# Patient Record
Sex: MALE | Race: WHITE | NOT HISPANIC OR LATINO | Employment: UNEMPLOYED | URBAN - METROPOLITAN AREA
[De-identification: names, ages, dates, MRNs, and addresses within clinical notes are randomized per-mention and may not be internally consistent; named-entity substitution may affect disease eponyms.]

---

## 2018-05-30 ENCOUNTER — APPOINTMENT (OUTPATIENT)
Dept: RADIOLOGY | Facility: MEDICAL CENTER | Age: 58
End: 2018-05-30
Attending: EMERGENCY MEDICINE

## 2018-05-30 ENCOUNTER — HOSPITAL ENCOUNTER (EMERGENCY)
Facility: MEDICAL CENTER | Age: 58
End: 2018-05-30
Attending: EMERGENCY MEDICINE

## 2018-05-30 VITALS
SYSTOLIC BLOOD PRESSURE: 99 MMHG | RESPIRATION RATE: 16 BRPM | BODY MASS INDEX: 23.27 KG/M2 | HEART RATE: 73 BPM | DIASTOLIC BLOOD PRESSURE: 64 MMHG | WEIGHT: 166.23 LBS | OXYGEN SATURATION: 93 % | TEMPERATURE: 98 F | HEIGHT: 71 IN

## 2018-05-30 DIAGNOSIS — S99.191A CLOSED FRACTURE OF BASE OF FIFTH METATARSAL BONE OF RIGHT FOOT AT METAPHYSEAL-DIAPHYSEAL JUNCTION, INITIAL ENCOUNTER: ICD-10-CM

## 2018-05-30 PROCEDURE — 99284 EMERGENCY DEPT VISIT MOD MDM: CPT

## 2018-05-30 PROCEDURE — 302874 HCHG BANDAGE ACE 2 OR 3""

## 2018-05-30 PROCEDURE — 73630 X-RAY EXAM OF FOOT: CPT | Mod: RT

## 2018-05-30 PROCEDURE — 302875 HCHG BANDAGE ACE 4 OR 6""

## 2018-05-30 PROCEDURE — 29515 APPLICATION SHORT LEG SPLINT: CPT

## 2018-05-30 ASSESSMENT — PAIN SCALES - GENERAL: PAINLEVEL_OUTOF10: 7

## 2018-05-31 NOTE — ED NOTES
Pt discharged home. Assessment complete. Pt ambulates self with crutches. VS stable. Pt verbalized understanding discharge instructions.

## 2018-05-31 NOTE — ED PROVIDER NOTES
"CHIEF COMPLAINT  Chief Complaint   Patient presents with   • Foot Pain     pt reports hurting his right foot 2 weeks ago. pt reports hitting his foot on side walk. pt has bruising and red toes on right foot. CMS intact. pulses present.        HPI  Sebastian Alvarado is a 57 y.o. male who presents With right foot pain for the past 2 weeks. States that he fell into a hole walking on a sidewalk. Denies any other significant injuries. Has a slight limp with walking. Denies any open wounds. No knee or hip pain. No loss of consciousness or headaches. No neck pain or back pain. Has swelling and bruising to the middle toes of the affected foot. Contralateral side is normal.    REVIEW OF SYSTEMS  See HPI for further details. All other systems are negative.     PAST MEDICAL HISTORY    Denies significant past medical history.    SOCIAL HISTORY  Social History     Social History Main Topics   • Smoking status: Current Every Day Smoker   • Smokeless tobacco: Never Used   • Alcohol use No      Comment: \"I stopped two weeks ago when I got busted for a DUI.\"   • Drug use: No   • Sexual activity: Not on file       SURGICAL HISTORY  patient denies any surgical history    CURRENT MEDICATIONS  Home Medications    **Home medications have not yet been reviewed for this encounter**         ALLERGIES  Allergies   Allergen Reactions   • Sulfa Drugs Unspecified     Pt reports \"I get really sick\"        PHYSICAL EXAM  VITAL SIGNS: BP (!) 92/62   Pulse 89   Temp 37 °C (98.6 °F)   Resp 16   Ht 1.803 m (5' 11\")   Wt 75.4 kg (166 lb 3.6 oz)   SpO2 95%   BMI 23.18 kg/m²   Pulse ox interpretation: I interpret this pulse ox as normal.  Constitutional: Alert in no apparent distress.  HENT: No signs of trauma, Bilateral external ears normal, Nose normal.   Neck: Normal range of motion, No tenderness, Supple, No stridor.   Cardiovascular: Regular rate and rhythm.   Thorax & Lungs: No respiratory distress.   Skin: Warm, Dry, No erythema, No rash. " "  Back: No bony tenderness, No CVA tenderness.   Extremities: Intact distal pulses, No edema,  Right foot tenderness to the distal foot and lateral  Foot, ecchymosis to the base of the second and third toes, No cyanosis  Musculoskeletal: Good range of motion in all major joints. No major deformities noted.   Neurologic: Alert , Normal motor function and gait, Normal sensory function, No focal deficits noted.       DIAGNOSTIC STUDIES / PROCEDURES      RADIOLOGY  DX-FOOT-COMPLETE 3+ RIGHT   Final Result         1.  Godoy fracture through the base of the fifth metatarsal.          COURSE & MEDICAL DECISION MAKING  Pertinent Labs & Imaging studies reviewed. (See chart for details)  57 y.o.  Male presenting with right foot pain following a fall 2 weeks ago when his foot fell into a hole, sidewalk. No open wounds. No obvious bony deformities. Does have significant tenderness of the foot however. No ankle, knee, hip tenderness or deformities. X-ray of the affected foot was performed showing evidence of a Godoy fracture. The patient was placed in a posterior short splint and was instructed regarding nonweightbearing status. To follow-up with Dr. Lizama,  [facility], for further management.    Patient was informed of the x-ray findings and the plan of care.. He reports feeling comfortable with discharge plan. To return immediately for any worsening of symptoms or development of any other concerning signs or symptoms.    The patient will return for worsening symptoms or failure of improvement and is stable at the time of discharge. The patient verbalizes understanding in their own words.    BP (!) 99/64   Pulse 73   Temp 36.7 °C (98 °F)   Resp 16   Ht 1.803 m (5' 11\")   Wt 75.4 kg (166 lb 3.6 oz)   SpO2 93%   BMI 23.18 kg/m²     Felipe Lizama M.D.  [address]  [facility] [address]  [phone]    Schedule an appointment as soon as possible for a visit      [facility], " Emergency Dept  [facility] [facility] [address]  [phone]    As needed, If symptoms worsen    Primary care doctor    Schedule an appointment as soon as possible for a visit        FINAL IMPRESSION  1. Closed fracture of base of fifth metatarsal bone of right foot at metaphyseal-diaphyseal junction, initial encounter            Electronically signed by: Tian Wang 5/30/2018 10:37 PM

## 2018-05-31 NOTE — DISCHARGE INSTRUCTIONS
Foot Fracture  Your caregiver has diagnosed you as having a foot fracture (broken bone). Your foot has many bones. You have a fracture, or break, in one of these bones. In some cases, your doctor may put on a splint or removable fracture boot until the swelling in your foot has lessened. A cast may or may not be required.  HOME CARE INSTRUCTIONS   If you do not have a cast or splint:  · You may bear weight on your injured foot as tolerated or advised.   · Do not put any weight on your injured foot for as long as directed by your caregiver. Slowly increase the amount of time you walk on the foot as the pain and swelling allows or as advised.   · Use crutches until you can bear weight without pain. A gradual increase in weight bearing may help.   · Apply ice to the injury for 15-20 minutes each hour while awake for the first 2 days. Put the ice in a plastic bag and place a towel between the bag of ice and your skin.   · If an ace bandage (stretchy, elastic wrapping bandage) was applied, you may re-wrap it if ankle is more painful or your toes become cold and swollen.   If you have a cast or splint:  · Use your crutches for as long as directed by your caregiver.   · To lessen the swelling, keep the injured foot elevated on pillows while lying down or sitting. Elevate your foot above your heart.   · Apply ice to the injury for 15-20 minutes each hour while awake for the first 2 days. Put the ice in a plastic bag and place a thin towel between the bag of ice and your cast.   · Plaster or fiberglass cast:   · Do not try to scratch the skin under the cast using a sharp or pointed object down the cast.   · Check the skin around the cast every day. You may put lotion on any red or sore areas.   · Keep your cast clean and dry.   · Plaster splint:   · Wear the splint until you are seen for a follow-up examination.   · You may loosen the elastic around the splint if your toes become numb, tingle, or turn blue or cold. Do not  rest it on anything harder than a pillow in the first 24 hours.   · Do not put pressure on any part of your splint. Use your crutches as directed.   · Keep your splint dry. It can be protected during bathing with a plastic bag. Do not lower the splint into water.   · If you have a fracture boot you may remove it to shower. Bear weight only as instructed by your caregiver.   · Only take over-the-counter or prescription medicines for pain, discomfort, or fever as directed by your caregiver.   SEEK IMMEDIATE MEDICAL CARE IF:   · Your cast gets damaged or breaks.   · You have continued severe pain or more swelling than you did before the cast was put on.   · Your skin or nails of your casted foot turn blue, gray, feel cold or numb.   · There is a bad smell from your cast.   · There is severe pain with movement of your toes.   · There are new stains and/or drainage coming from under the cast.   MAKE SURE YOU:   · Understand these instructions.   · Will watch your condition.   · Will get help right away if you are not doing well or get worse.   Document Released: 12/15/2001 Document Revised: 03/11/2013 Document Reviewed: 01/21/2010  [employer]® Patient Information ©2013 [employer], [employer].

## 2018-05-31 NOTE — ED TRIAGE NOTES
"Chief Complaint   Patient presents with   • Foot Pain     pt reports hurting his right foot 2 weeks ago. pt reports hitting his foot on side walk. pt has bruising and red toes on right foot. CMS intact. pulses present.        Pt ambulatory to triage with above complaint, pt educated on triage process, placed back in lobby, pt instructed to notify staff of any issues.     Blood pressure (!) 92/62, pulse 89, temperature 37 °C (98.6 °F), resp. rate 16, height 1.803 m (5' 11\"), weight 75.4 kg (166 lb 3.6 oz), SpO2 95 %.    "